# Patient Record
Sex: MALE | Race: WHITE | Employment: FULL TIME | URBAN - METROPOLITAN AREA
[De-identification: names, ages, dates, MRNs, and addresses within clinical notes are randomized per-mention and may not be internally consistent; named-entity substitution may affect disease eponyms.]

---

## 2019-01-16 NOTE — H&P
Mira Maloney MD  
Physician General Surgery Progress Notes     
Signed Encounter Date:  19 []Hide copied text []Sugey for details   
  
  
Name: Farnaz Thomas MRN: [id number] : 1960 Age: 60 y.o. Sex: male Greg Deal MD  
  
  
CC:  No chief complaint on file. 
  
  
HPI:  The patient is referred here for a colonoscopy by Dr. Jose Ly. The patient has not had a colonoscopy. He denies abdominal pain, nausea or vomiting.   
  
  
Family hx of colon cancer NO Previous colonoscopy NO  
BRBPR NO  
Melena NO Loss of appetite NO Weight loss NO Change in bowel habits NO  
  
  
HISTORY: 
  
    
Past Medical History:  
Diagnosis Date  Gout    
  
     
Past Surgical History:  
Procedure Laterality Date  HX VASECTOMY      
  
       
Prior to Admission medications Medication Sig Start Date End Date Taking? Authorizing Provider  
allopurinol (ZYLOPRIM) 300 mg tablet Take 1 Tab by mouth daily. 10/8/18     Jeyson Langston MD  
  
Social History  
  
    
Tobacco Use  Smoking status: Never Smoker  Smokeless tobacco: Never Used Substance Use Topics  Alcohol use: No  
 Drug use: No  
  
No family history on file. Jose [place] No Known Allergies 
  
Physical Exam:  
  
There were no vitals taken for this visit. 
  
General: Alert, oriented, cooperative white male in no acute distress.  
  
  
Resp: Breathing is  non-labored. Lungs clear to auscultation without wheezing or rhonchi CV: RRR. No murmurs, rubs or gallops appreciated. Abd: soft non-tender and non-distended without peritoneal signs. +bs Extremities: No clubbing, cyanosis or edema. Strength intact. 
  
  
Assessment/Plan:  Farnaz Thomas is a 60 y.o. male who is here for a colonoscopy for screening purposes. 
  
1. Off ASA for one week, if on aspirin 
  
2.          Bowel prep 
  
 3. Colonoscopy with MAC. I went through the risks of bleeding, perforation of the colon and cardiopulmonary problems that can develop with the use of anesthesia. 
  
Terri Ochoa MD 
  
 FACS   1/16/19    
  
  
  
  
 
  
  
Electronically signed by Audie Worthington MD at 1/16/19 Note Details Reagan Rendon MD File Time 1/16/19 Author Type Physician Status Signed Last  Audie Worthington MD Specialty General Surgery 1/16/19 Detailed Report Note shared with patient

## 2019-01-18 ENCOUNTER — ANESTHESIA EVENT (OUTPATIENT)
Dept: ENDOSCOPY | Age: 59
End: 2019-01-18
Payer: COMMERCIAL

## 2019-01-18 ENCOUNTER — ANESTHESIA (OUTPATIENT)
Dept: ENDOSCOPY | Age: 59
End: 2019-01-18
Payer: COMMERCIAL

## 2019-01-18 ENCOUNTER — HOSPITAL ENCOUNTER (OUTPATIENT)
Age: 59
Setting detail: OUTPATIENT SURGERY
Discharge: HOME | End: 2019-01-18
Attending: SURGERY | Admitting: SURGERY
Payer: COMMERCIAL

## 2019-01-18 VITALS
HEIGHT: 70 IN | RESPIRATION RATE: 15 BRPM | OXYGEN SATURATION: 97 % | SYSTOLIC BLOOD PRESSURE: 118 MMHG | HEART RATE: 64 BPM | WEIGHT: 209 LBS | BODY MASS INDEX: 29.92 KG/M2 | TEMPERATURE: 98 F | DIASTOLIC BLOOD PRESSURE: 82 MMHG

## 2019-01-18 PROCEDURE — 76040000025: Performed by: SURGERY

## 2019-01-18 PROCEDURE — 74011250636 HC RX REV CODE- 250/636

## 2019-01-18 PROCEDURE — 74011250636 HC RX REV CODE- 250/636: Performed by: ANESTHESIOLOGY

## 2019-01-18 PROCEDURE — 76060000032 HC ANESTHESIA 0.5 TO 1 HR: Performed by: SURGERY

## 2019-01-18 PROCEDURE — 74011000250 HC RX REV CODE- 250

## 2019-01-18 RX ORDER — PROPOFOL 10 MG/ML
INJECTION, EMULSION INTRAVENOUS AS NEEDED
Status: DISCONTINUED | OUTPATIENT
Start: 2019-01-18 | End: 2019-01-18 | Stop reason: HOSPADM

## 2019-01-18 RX ORDER — SODIUM CHLORIDE, SODIUM LACTATE, POTASSIUM CHLORIDE, CALCIUM CHLORIDE 600; 310; 30; 20 MG/100ML; MG/100ML; MG/100ML; MG/100ML
75 INJECTION, SOLUTION INTRAVENOUS
Status: DISCONTINUED | OUTPATIENT
Start: 2019-01-18 | End: 2019-01-18 | Stop reason: HOSPADM

## 2019-01-18 RX ORDER — GLYCOPYRROLATE 0.2 MG/ML
INJECTION INTRAMUSCULAR; INTRAVENOUS AS NEEDED
Status: DISCONTINUED | OUTPATIENT
Start: 2019-01-18 | End: 2019-01-18 | Stop reason: HOSPADM

## 2019-01-18 RX ORDER — PROPOFOL 10 MG/ML
INJECTION, EMULSION INTRAVENOUS
Status: DISCONTINUED | OUTPATIENT
Start: 2019-01-18 | End: 2019-01-18 | Stop reason: HOSPADM

## 2019-01-18 RX ADMIN — PROPOFOL 50 MG: 10 INJECTION, EMULSION INTRAVENOUS at 10:33

## 2019-01-18 RX ADMIN — PROPOFOL 140 MCG/KG/MIN: 10 INJECTION, EMULSION INTRAVENOUS at 10:33

## 2019-01-18 RX ADMIN — GLYCOPYRROLATE 0.4 MG: 0.2 INJECTION INTRAMUSCULAR; INTRAVENOUS at 10:39

## 2019-01-18 RX ADMIN — PROPOFOL 50 MG: 10 INJECTION, EMULSION INTRAVENOUS at 10:35

## 2019-01-18 RX ADMIN — SODIUM CHLORIDE, SODIUM LACTATE, POTASSIUM CHLORIDE, AND CALCIUM CHLORIDE 75 ML/HR: 600; 310; 30; 20 INJECTION, SOLUTION INTRAVENOUS at 09:36

## 2019-01-18 NOTE — ANESTHESIA PREPROCEDURE EVALUATION
Anesthetic History Review of Systems / Medical History Patient summary reviewed Pulmonary Neuro/Psych Cardiovascular GI/Hepatic/Renal 
  
 
 
 
 
 
 Endo/Other Arthritis Other Findings Physical Exam 
 
Airway Mallampati: II 
TM Distance: > 6 cm Neck ROM: normal range of motion Mouth opening: Normal 
 
 Cardiovascular Regular rate and rhythm,  S1 and S2 normal,  no murmur, click, rub, or gallop Dental 
No notable dental hx Pulmonary Breath sounds clear to auscultation Abdominal 
 
 
 
 Other Findings Anesthetic Plan ASA: 1 Anesthesia type: total IV anesthesia Anesthetic plan and risks discussed with: Patient

## 2019-01-18 NOTE — INTERVAL H&P NOTE
H&P Update: 
Lillie Goel was seen and examined. History and physical has been reviewed. The patient has been examined.  There have been no significant clinical changes since the completion of the originally dated History and Physical. 
 
Signed By: Latoya Weinstein MD   
 January 18, 2019 10:00 AM

## 2019-01-18 NOTE — ANESTHESIA POSTPROCEDURE EVALUATION
Procedure(s): 
COLONOSCOPY  BMI 33. Anesthesia Post Evaluation Patient location during evaluation: PACU Patient participation: complete - patient participated Level of consciousness: awake and alert Pain management: adequate Airway patency: patent Anesthetic complications: no 
Cardiovascular status: acceptable Respiratory status: acceptable Hydration status: acceptable Post anesthesia nausea and vomiting:  none Visit Vitals /79 Pulse 68 Temp 36.7 °C (98 °F) Resp 15 Ht 5' 10\" (1.778 m) Wt 94.8 kg (209 lb) SpO2 98% BMI 29.99 kg/m²

## 2019-01-18 NOTE — ROUTINE PROCESS
VSS. Discharge instructions reviewed with patient and spouse and copy of instructions sent home with patient. Dr. Hayde Jones spoke with patient and spouse prior to discharge. Questions answered. Discharged via private car, wheeled out by myself. IV discontinued prior to discharge. Personal items with patient at discharge: Clothing

## 2019-01-18 NOTE — OP NOTES
[facility] REPORT    Jaimee Leal  MR#: [id number]  : 1960  ACCOUNT #: [de-identified]   DATE OF SERVICE: 2019    PREOPERATIVE DIAGNOSIS:  Request for screening colonoscopy. POSTOPERATIVE DIAGNOSES:  1.  Grade I internal hemorrhoids. 2.  No masses, polyps, bleeding or other abnormalities noted. The overall bowel prep was good. PROCEDURE PERFORMED:  Colonoscopy. SURGEON:  Jonathon Avalos MD    ANESTHESIA:  Monitored anesthesia care with Dr. Aide Orr and Cara Serna, the nurse anesthetist.    ESTIMATED BLOOD LOSS:  None. SPECIMENS REMOVED:  None. COMPLICATIONS:  None. DRAINS:  None. ASSISTANT:  None. IMPLANTS:  None. HISTORY:  A 50-year-old male referred by Dr. Lynda Hein for a colonoscopy. I saw the patient in the office. I went through the procedure, risks of bleeding, infection, anesthesia, perforation of the colon. He was agreeable and signed a consent form and was scheduled for today. He underwent a bowel prep on 2019, came into the [address] [place] [address]. He was seen with his wife, Michelle Franks, and then transported to room #4 at [facility] endoscopy center. He was placed in a stretcher in the left lateral decubitus position. Oxygen via nasal cannula was administered. A timeout was done, identifying the patient, surgeon, procedures, and his birth date of 1960. When everyone in the room agreed with the time-out, I began the procedure. Monitored anesthesia care was done by Dr. Aide Orr and Cara Serna, the nurse anesthetist.  Once sedative effects had taken hold, an adult colonoscope was advanced through the anus and all the way to the cecum. The cecum was clearly identified with the ileocecal valve, cecal fold, and external compression, right lower quadrant corresponding to an indentation seen with the scope. Scope was slowly withdrawn over a 6-minute period of time.   There were no lesions noted in the cecum, ascending colon, transverse colon, descending colon and sigmoid colon. No masses, polyps, bleeding or other mucosal abnormalities were noted. Scope was brought back to the rectum. He had some grade I internal hemorrhoids. Scope was withdrawn. Digital rectal examination revealed good sphincter tone. Prostate was slightly enlarged. No nodules palpated. FINDINGS:  1.  Grade I internal hemorrhoids. 2.  No masses, polyps, bleeding noted. PLAN:  Repeat colonoscopy in 10 years or sooner if symptoms develop.       MD ELEANOR Powers / BRO  D: 01/18/2019 11:00     T: 01/18/2019 11:36  JOB #: [id number]

## 2019-01-18 NOTE — DISCHARGE INSTRUCTIONS
Gastrointestinal Colonoscopy/Flexible Sigmoidoscopy - Lower Exam Discharge Instructions  1. Call Dr. Katherine Phoenix for any problems or questions. 2. Contact the doctors office for follow up appointment as directed  3. Medication may cause drowsiness for several hours, therefore, do not drive or operate machinery for remainder of the day. 4. No alcohol today. 5. Ordinarily, you may resume regular diet and activity after exam unless otherwise specified by your physician. 6. Because of air put into your colon during exam, you may experience some abdominal distension, relieved by the passage of gas, for several hours. 7. Contact your physician if you have any of the following:  a. Excessive amount of bleeding - large amount when having a bowel movement. Occasional specks of blood with bowel movement would not be unusual.  b. Severe abdominal pain  c. Fever or Chills  Any additional instructions:  Repeat colonoscopy in 10 years.

## 2022-10-10 ENCOUNTER — APPOINTMENT (RX ONLY)
Dept: URBAN - METROPOLITAN AREA CLINIC 23 | Facility: CLINIC | Age: 62
Setting detail: DERMATOLOGY
End: 2022-10-10

## 2022-10-10 DIAGNOSIS — L57.8 OTHER SKIN CHANGES DUE TO CHRONIC EXPOSURE TO NONIONIZING RADIATION: ICD-10-CM

## 2022-10-10 DIAGNOSIS — D22 MELANOCYTIC NEVI: ICD-10-CM

## 2022-10-10 DIAGNOSIS — D18.0 HEMANGIOMA: ICD-10-CM

## 2022-10-10 PROBLEM — D22.5 MELANOCYTIC NEVI OF TRUNK: Status: ACTIVE | Noted: 2022-10-10

## 2022-10-10 PROBLEM — D18.01 HEMANGIOMA OF SKIN AND SUBCUTANEOUS TISSUE: Status: ACTIVE | Noted: 2022-10-10

## 2022-10-10 PROCEDURE — 99203 OFFICE O/P NEW LOW 30 MIN: CPT

## 2022-10-10 PROCEDURE — ? COUNSELING

## 2022-10-10 ASSESSMENT — LOCATION DETAILED DESCRIPTION DERM
LOCATION DETAILED: RIGHT MID-UPPER BACK
LOCATION DETAILED: RIGHT SUPERIOR PARIETAL SCALP
LOCATION DETAILED: RIGHT SUPERIOR MEDIAL UPPER BACK

## 2022-10-10 ASSESSMENT — LOCATION ZONE DERM
LOCATION ZONE: TRUNK
LOCATION ZONE: SCALP

## 2022-10-10 ASSESSMENT — LOCATION SIMPLE DESCRIPTION DERM
LOCATION SIMPLE: SCALP
LOCATION SIMPLE: RIGHT UPPER BACK

## 2022-10-10 NOTE — PROCEDURE: COUNSELING
Detail Level: Zone
Detail Level: Generalized
Sunscreen Recommendations: Broad spectrum
Topical Retinoids Recommendations: See Heidi for treatment options.

## (undated) DEVICE — CONNECTOR TBNG OD5-7MM O2 END DISP

## (undated) DEVICE — CANNULA NSL ORAL AD FOR CAPNOFLEX CO2 O2 AIRLFE

## (undated) DEVICE — SYR 5ML 1/5 GRAD LL NSAF LF --

## (undated) DEVICE — SYR 3ML LL TIP 1/10ML GRAD --

## (undated) DEVICE — KENDALL RADIOLUCENT FOAM MONITORING ELECTRODE RECTANGULAR SHAPE: Brand: KENDALL

## (undated) DEVICE — NDL PRT INJ NSAF BLNT 18GX1.5 --